# Patient Record
Sex: MALE | Race: WHITE | Employment: OTHER | URBAN - METROPOLITAN AREA
[De-identification: names, ages, dates, MRNs, and addresses within clinical notes are randomized per-mention and may not be internally consistent; named-entity substitution may affect disease eponyms.]

---

## 2017-01-16 ENCOUNTER — TRANSFERRED RECORDS (OUTPATIENT)
Dept: HEALTH INFORMATION MANAGEMENT | Facility: CLINIC | Age: 77
End: 2017-01-16

## 2017-01-17 ENCOUNTER — TRANSFERRED RECORDS (OUTPATIENT)
Dept: HEALTH INFORMATION MANAGEMENT | Facility: CLINIC | Age: 77
End: 2017-01-17

## 2017-06-06 ENCOUNTER — TRANSFERRED RECORDS (OUTPATIENT)
Dept: HEALTH INFORMATION MANAGEMENT | Facility: CLINIC | Age: 77
End: 2017-06-06

## 2018-04-23 ENCOUNTER — TRANSFERRED RECORDS (OUTPATIENT)
Dept: HEALTH INFORMATION MANAGEMENT | Facility: CLINIC | Age: 78
End: 2018-04-23

## 2018-04-25 ENCOUNTER — OFFICE VISIT (OUTPATIENT)
Dept: SLEEP MEDICINE | Facility: HOSPITAL | Age: 78
End: 2018-04-25
Attending: INTERNAL MEDICINE
Payer: MEDICARE

## 2018-04-25 DIAGNOSIS — R09.02 HYPOXIA: Primary | ICD-10-CM

## 2018-04-25 NOTE — NURSING NOTE
Patient picked up over night oximeter number [id number]. Patient was instructed on use of device. Patient verbalized understanding.     Patient will return device tomorrow by: noon

## 2018-04-25 NOTE — MR AVS SNAPSHOT
"              After Visit Summary   2018    Arjun Hassan    MRN: [id number]           Patient Information     Date Of Birth          1940        Visit Information        Provider Department      2018 12:45 PM [facility]        Today's Diagnoses     Hypoxia    -  1       Follow-ups after your visit        Who to contact     If you have questions or need follow up information about today's clinic visit or your schedule please contact [facility] directly at [phone].  Normal or non-critical lab and imaging results will be communicated to you by Wor[employer], letter or phone within 4 business days after the clinic has received the results. If you do not hear from us within 7 days, please contact the clinic through [employer] or phone. If you have a critical or abnormal lab result, we will notify you by phone as soon as possible.  Submit refill requests through [employer] or call your pharmacy and they will forward the refill request to us. Please allow 3 business days for your refill to be completed.          Additional Information About Your Visit        Worke[employer]ica Information     [employer] lets you send messages to your doctor, view your test results, renew your prescriptions, schedule appointments and more. To sign up, go to [email or web address] . Click on \"Log in\" on the left side of the screen, which will take you to the Welcome page. Then click on \"Sign up Now\" on the right side of the page.     You will be asked to enter the access code listed below, as well as some personal information. Please follow the directions to create your username and password.     Your access code is: [email or web address]  Expires: 2018 11:43 AM     Your access code will  in 90 days. If you need help or a new code, please call your [place] clinic or [phone].        Care EveryWhere ID     This is your Care EveryWhere ID. This could be used by other organizations to access your [place] medical " records  [id number]         Blood Pressure from Last 3 Encounters:   05/14/18 111/70    Weight from Last 3 Encounters:   05/14/18 278 lb (126.1 kg)              We Performed the Following     OXIMETRY - HIM SCAN        Primary Care Provider Office Phone # Fax #    Willie Yuen [phone]       [facility] [address]  [facility] [address]        Equal Access to Services     [facility] : Hadii aad ku hadasho Soomaali, waaxda luqadaha, qaybta kaalmada adeegyada, waxay idiin hayaan adeeg kharash la'aan . So [facility] [phone].    ATENCIÓN: Si habla español, tiene a amado disposición servicios gratuitos de asistencia lingüística. Llame al [phone].    We comply with applicable federal civil rights laws and [place] laws. We do not discriminate on the basis of race, color, national origin, age, disability, sex, sexual orientation, or gender identity.            Thank you!     Thank you for choosing [facility]  for your care. Our goal is always to provide you with excellent care. Hearing back from our patients is one way we can continue to improve our services. Please take a few minutes to complete the written survey that you may receive in the mail after your visit with us. Thank you!             Your Updated Medication List - Protect others around you: Learn how to safely use, store and throw away your medicines at www.disposemymeds.org.      Notice  As of 4/25/2018 11:59 PM    You have not been prescribed any medications.

## 2018-05-07 DIAGNOSIS — R06.02 SOB (SHORTNESS OF BREATH): Primary | ICD-10-CM

## 2018-05-10 NOTE — TELEPHONE ENCOUNTER
FUTURE VISIT INFORMATION      FUTURE VISIT INFORMATION:    Date: 5.14.18    Time: 8:05 AM    [facility] [facility]  REFERRAL INFORMATION:    Referring provider:  Dr. Yusuf Ruiz    Referring providers clinic:  [facility]    Reason for visit/diagnosis  SOB    RECORDS REQUESTED FROM:       Clinic name Comments Records Status Imaging Status   [facility] Received referral and summary of pt's pulmonary issues. However did not include detailed ov or imaging. Requested Requested                                   RECORDS STATUS        **Called [phone] on 5.10.18 for urgent request for records om 5.10.18  **2nd request on 5.11.18. Called number listed above.

## 2018-05-14 ENCOUNTER — RADIANT APPOINTMENT (OUTPATIENT)
Dept: GENERAL RADIOLOGY | Facility: CLINIC | Age: 78
End: 2018-05-14
Attending: INTERNAL MEDICINE
Payer: MEDICARE

## 2018-05-14 ENCOUNTER — PRE VISIT (OUTPATIENT)
Dept: PULMONOLOGY | Facility: CLINIC | Age: 78
End: 2018-05-14

## 2018-05-14 ENCOUNTER — OFFICE VISIT (OUTPATIENT)
Dept: PULMONOLOGY | Facility: CLINIC | Age: 78
End: 2018-05-14
Payer: MEDICARE

## 2018-05-14 VITALS
WEIGHT: 278 LBS | BODY MASS INDEX: 41.18 KG/M2 | HEIGHT: 69 IN | DIASTOLIC BLOOD PRESSURE: 70 MMHG | RESPIRATION RATE: 17 BRPM | HEART RATE: 66 BPM | OXYGEN SATURATION: 95 % | SYSTOLIC BLOOD PRESSURE: 111 MMHG

## 2018-05-14 DIAGNOSIS — J44.9 COPD (CHRONIC OBSTRUCTIVE PULMONARY DISEASE) (H): Primary | ICD-10-CM

## 2018-05-14 DIAGNOSIS — R06.02 SOB (SHORTNESS OF BREATH): Primary | ICD-10-CM

## 2018-05-14 DIAGNOSIS — R06.02 SOB (SHORTNESS OF BREATH): ICD-10-CM

## 2018-05-14 DIAGNOSIS — E66.01 MORBID OBESITY (H): ICD-10-CM

## 2018-05-14 PROCEDURE — G0463 HOSPITAL OUTPT CLINIC VISIT: HCPCS | Mod: ZF

## 2018-05-14 RX ORDER — BUDESONIDE AND FORMOTEROL FUMARATE DIHYDRATE 160; 4.5 UG/1; UG/1
2 AEROSOL RESPIRATORY (INHALATION) DAILY
COMMUNITY
End: 2020-08-20

## 2018-05-14 RX ORDER — FAMOTIDINE 10 MG
10 TABLET ORAL 2 TIMES DAILY
COMMUNITY

## 2018-05-14 RX ORDER — POLYETHYLENE GLYCOL 3350 17 G/17G
1 POWDER, FOR SOLUTION ORAL DAILY
COMMUNITY

## 2018-05-14 RX ORDER — DIMENHYDRINATE 50 MG
1 TABLET ORAL DAILY
COMMUNITY

## 2018-05-14 RX ORDER — NAPROXEN SODIUM 220 MG
TABLET ORAL 2 TIMES DAILY WITH MEALS
COMMUNITY

## 2018-05-14 ASSESSMENT — PAIN SCALES - GENERAL: PAINLEVEL: NO PAIN (0)

## 2018-05-14 NOTE — MR AVS SNAPSHOT
"              After Visit Summary   2018    Arjun Hassan    MRN: [id number]           Patient Information     Date Of Birth          1940        Visit Information        Provider Department      2018 8:05 AM Gilmar Seymour MD [facility]        Today's Diagnoses     SOB (shortness of breath)    -  1    Morbid obesity (H)           Follow-ups after your visit        Who to contact     If you have questions or need follow up information about today's clinic visit or your schedule please contact [facility] directly at [phone].  Normal or non-critical lab and imaging results will be communicated to you by [facility], letter or phone within 4 business days after the clinic has received the results. If you do not hear from us within 7 days, please contact the clinic through [facility] or phone. If you have a critical or abnormal lab result, we will notify you by phone as soon as possible.  Submit refill requests through [employer] or call your pharmacy and they will forward the refill request to us. Please allow 3 business days for your refill to be completed.          Additional Information About Your Visit        MyChart Information     [employer] lets you send messages to your doctor, view your test results, renew your prescriptions, schedule appointments and more. To sign up, go to [email or web address] . Click on \"Log in\" on the left side of the screen, which will take you to the Welcome page. Then click on \"Sign up Now\" on the right side of the page.     You will be asked to enter the access code listed below, as well as some personal information. Please follow the directions to create your username and password.     Your access code is: [email or web address]  Expires: 2018 11:43 AM     Your access code will  in 90 days. If you need help or a new code, please call your [place] clinic or [phone].        Care EveryWhere ID     " "This is your Care EveryWhere ID. This could be used by other organizations to access your [place] medical records  [id number]        Your Vitals Were     Pulse Respirations Height Pulse Oximetry BMI (Body Mass Index)       66 17 1.753 m (5' 9\") 95% 41.05 kg/m2        Blood Pressure from Last 3 Encounters:   05/14/18 111/70    Weight from Last 3 Encounters:   05/14/18 126.1 kg (278 lb)              Today, you had the following     No orders found for display       Primary Care Provider Office Phone # Fax #    Willie Yuen [phone]       [facility] [address] [facility] [address]  [facility] [address]        Equal Access to Services     ELEANOR BOWMAN : Hadii mario joseph hadasho Soomaali, waaxda luqadaha, qaybta kaalmada adeegyada, sole burgos . So [place] -[phone].    ATENCIÓN: Si habla español, tiene a amado disposición servicios gratuitos de asistencia lingüística. Llame al [phone].    We comply with applicable federal civil rights laws and [place] laws. We do not discriminate on the basis of race, color, national origin, age, disability, sex, sexual orientation, or gender identity.            Thank you!     Thank you for choosing [facility]  for your care. Our goal is always to provide you with excellent care. Hearing back from our patients is one way we can continue to improve our services. Please take a few minutes to complete the written survey that you may receive in the mail after your visit with us. Thank you!             Your Updated Medication List - Protect others around you: Learn how to safely use, store and throw away your medicines at www.disposemymeds.org.          This list is accurate as of 5/14/18 11:43 AM.  Always use your most recent med list.                   Brand Name Dispense Instructions for use Diagnosis    ACID CONTROLLER 10 MG tablet   Generic drug:  famotidine      Take 10 mg by mouth 2 times daily        ALEVE " 220 MG tablet   Generic drug:  naproxen sodium      Take by mouth 2 times daily (with meals)        ASPIRIN 81 PO           cholecalciferol 1000 UNIT tablet    vitamin D3     Take 1,000 Units by mouth daily        FIBER CHOICE PO           flax seed oil 1000 MG capsule      Take 1 capsule by mouth daily        LEVOTHYROXINE SODIUM PO      Take 175 mcg by mouth daily        LISINOPRIL PO      Take 10 mg by mouth daily        METOPROLOL SUCCINATE ER PO      Take 50 mg by mouth daily        MULTIVITAMIN ADULTS 50+ PO           polyethylene glycol Packet    MIRALAX/GLYCOLAX     Take 1 packet by mouth daily        SALMON OIL PO      Take 1,000 mg by mouth daily        SPIRIVA RESPIMAT 2.5 MCG/ACT inhalation aerosol   Generic drug:  tiotropium      Inhale 2 puffs into the lungs daily        STOOL SOFTENER PO      Take 100 mg by mouth daily        SULINDAC PO      Take 150 mg by mouth as needed        SYMBICORT 160-4.5 MCG/ACT Inhaler   Generic drug:  budesonide-formoterol      Inhale 2 puffs into the lungs daily

## 2018-05-14 NOTE — NURSING NOTE
Chief Complaint   Patient presents with     Consult     Patient being seen for ongoing/worsening shortness of breath     Alejandro Barrera

## 2018-05-14 NOTE — PROGRESS NOTES
[facility]  Pulmonary Medicine  Visit Clinic Note  May 14, 2018         ASSESSMENT & PLAN       Shortness of breath: This is a 77-year-old male who is presenting for her third evaluation of shortness of breath.    Unfortunately I do not have a lot of the original data regarding his workup at [facility] or at the [facility].  It sounds like he has already been through quite a lot, and I did not feel that ordering more tests at this time would be appropriate until I have had a chance to review all the previous testing that he has already had done.    I think some of the notable findings so far on my history and exam are the fact that he has a baseline low-normal oxygen level, but it is improved with exercise.  This is in the setting of having a normal DLCO which corrects to supranormal levels when his alveolar volume is taken into consideration.  This combination suggests atelectasis and hypoventilation as a cause for a lot of his low normal oxygen levels.  During exertion, his tidal volume increases which potentially opens up more alveoli and provides better gas exchange. This could be from his obesity. This gas exchange issue alone may not be the sole reason for his sensation of dyspnea though.    Factors arguing against his obesity as a cause for his symptoms would be that he has lost weight recently and is actually had worsening of his symptoms.    It does not appear that he has COPD.  He gave a good PFT study today, and there is no airflow obstruction.  This doesn't rule out asthma as cause for shortness of breath.      I would like to see all of his formal pulmonary function testing, an echocardiogram, right and left heart catheterization data, as well as blood work that he has received in the past.  We will reach out to the hospitals that he has been worked up at to get all of the original testing results.  I will give him a phone call after I have reviewed all of this to see if I  have any difference of opinion as a cause for his shortness of breath.     He will be back in [place] in late June for a classic car show and we can try to arrange any other testing to be done here if it needs to be done.  Otherwise we can try to arrange for testing locally.     Baldomero Seymour MD     I spent a total of 60 minutes face-to-face with Arjun Hassan during today's office visit.  Over 50% of this time was spent counseling the patient and/or coordinating care regarding possible causes for his shortness of breath, and need for further review of testing to give full opinion. We talked about possible causes for his dyspnea.  See note for details.       Today's visit note:     Chief Complaint: Arjun Hassan is a 77 year old year old male who is being seen for Consult (Patient being seen for ongoing/worsening shortness of breath)      HISTORY OF PRESENT ILLNESS:    This is a 77-year-old male presenting for third opinion regarding shortness of breath.    He lives up in [facility] [place], and has been seen by a local pulmonologist at [facility].  He has had what sounds like an extensive workup for his shortness of breath without any obvious cause.  This prompted a referral to the [facility], where he spent 3 days undergoing testing.  Could not come up with a conclusive reason for his shortness of breath at the [facility].  He is here today for another evaluation.    He notes that about 3 years ago he is feeling fine.  He retired from working as a  at the post [employer].  Since that time he has been experiencing increasing and progressive shortness of breath.  He has been diagnosed with coronary artery disease, COPD, and sleep apnea.  Sleep apnea in fact is been going on for decades, but he has had alterations to his positive pressure.  Is also been prescribed oxygen in the past, but that did not significantly help out with his symptoms.  Notes that his oxygen level was around 88% to  91% during the day at rest, but actually improved with exercise.    There is no cough.  Sometimes he feels a very subtle pressure across his chest.  He wakes up in the morning.  He does not describe it as a pain.  He uses his CPAP every night.      He can walk about 400-500 feet before he needs to take a break.  He also says that he can walk a block or 2 without a problem as long as he paces himself.  He can walk up a flight of stairs without break, but he does get short of breath at the top of the staircase.    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     Takes Symbicort and Spiriva Respimat.  2 puffs of each inhaler at night.  He does not think that they are making substantial improvements in his symptoms, but does admit that he feels a little little less short of breath after taking them.    He has lost about 30 pounds year or so.  In the setting of this his symptoms got worse.    Has been told he is a challenging case.      JOSEPH Bowman. Drover here last night.       No cough. someimtes a pressure across his chest. Sometimes feels it when he wakes up.  Not a pain.     Takes symbicort and spiriva respimat.  2 puffs at night. Not sure if it helping or not.  Maybe helps a bit.      Lost about 30 pounds recently. Dyspnea only has gotten worse.          Past Medical and Surgical History:     Past Medical History:   Diagnosis Date     CAD (coronary artery disease)      COPD (chronic obstructive pulmonary disease) (H)      HTN (hypertension)      Hypothyroidism      NEHEMIAS on CPAP     for the last 30 years     Osteoarthritis      Pneumonia     recurrent pneumonia     Seborrheic keratoses      Past Surgical History:   Procedure Laterality Date     coronary stent placement      x 2     HERNIA REPAIR      right side     lumbar fixation       RELEASE CARPAL TUNNEL Left      right hip arthroplasty       total knee replacement x 2       VASECTOMY             Family History:  "    Family History   Problem Relation Age of Onset     Dementia Mother      DIABETES Mother      CANCER Sister      HEART DISEASE Brother      CANCER Brother               Social History:     Social History     Social History     Marital status:      Spouse name: N/A     Number of children: N/A     Years of education: N/A     Occupational History     Not on file.     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  His hobbies include traveling around to classic car shows, as well as participating in a lot of parades where he has viking float for the \"[place]\"         Medications:     Current Outpatient Prescriptions   Medication     ASPIRIN 81 PO     budesonide-formoterol (SYMBICORT) 160-4.5 MCG/ACT Inhaler     cholecalciferol (VITAMIN D3) 1000 UNIT tablet     Docusate Calcium (STOOL SOFTENER PO)     famotidine (ACID CONTROLLER) 10 MG tablet     Flaxseed, Linseed, (FLAX SEED OIL) 1000 MG capsule     Inulin (FIBER CHOICE PO)     LEVOTHYROXINE SODIUM PO     LISINOPRIL PO     METOPROLOL SUCCINATE ER PO     Multiple Vitamins-Minerals (MULTIVITAMIN ADULTS 50+ PO)     naproxen sodium (ALEVE) 220 MG tablet     Omega-3 Fatty Acids (SALMON OIL PO)     polyethylene glycol (MIRALAX/GLYCOLAX) Packet     SULINDAC PO     tiotropium (SPIRIVA RESPIMAT) 2.5 MCG/ACT inhalation aerosol     No current facility-administered medications for this visit.             Review of Systems:       A complete review of systems was otherwise negative except as noted in the HPI.      PHYSICAL EXAM:  /70  Pulse 66  Resp 17  Ht 1.753 m (5' 9\")  Wt 126.1 kg (278 lb)  SpO2 95%  BMI 41.05 kg/m2     General: Well developed, well nourished, No apparent distress  Eyes: " Anicteric  Nose: Nasal mucosa with no edema or hyperemia.  No polyps  Ears: Hearing grossly normal  Mouth: Oral mucosa is moist, without any lesions. No oropharyngeal exudate.  Neck: supple, no thyromegaly  Lymphatics: No cervical or supraclavicular nodes  Respiratory: Good air movement. No crackles. No rhonchi. No wheezes  Cardiac: RRR, normal S1, S2. No murmurs. No JVD  Abdomen: Soft, NT/ND  Musculoskeletal: Extremities normal. No clubbing. No cyanosis. No edema.  Skin: No rash on limited exam  Neuro: Normal mentation. Normal speech.  Psych:Normal affect           Data:   All laboratory and imaging data reviewed.      PFT:   Pulmonary function testing shows an FEV1/FVC ratio of 77%.  His FEV1 is 2.36 L which is 83% predicted.  His FVC is 3.08 which is 81% predicted.  Lung volumes show a total lung capacity of 6.04 L which is 87% predicted and the residual volume is 101% predicted.  His diffusion capacity is 107% predicted.    PFT Interpretation:  No airflow obstruction.  Normal lung volumes.  Normal diffusion capacity.  Valid Maneuver    Overnight oximetry:  Total time of analysis was 9 hours and 50 minutes.  He had a saturation under 90% for 12% of the study.  He had a saturation under 85% for 0.2% of the study.  Basal oxygen saturation is 92.1%, and the maximum single time that he is under 88% was 420 seconds.  Lowest pulse rate was 48 bpm.    CXR: Official radiology read is pending, but on my read the lung volumes appear normal.  There is no infiltrate.  Normal cardiac size.  There may be some blunting of the left costophrenic angle, but no abnormalities on the lateral imaging.      Recent Results (from the past 168 hour(s))   General PFT Lab (Please always keep checked)    Collection Time: 05/14/18  6:50 AM   Result Value Ref Range    FVC-Pred 3.80 L    FVC-Pre 3.08 L    FVC-%Pred-Pre 81 %    FEV1-Pre 2.36 L    FEV1-%Pred-Pre 83 %    FEV1FVC-Pred 72 %    FEV1FVC-Pre 77 %    FEFMax-Pred 7.15 L/sec    FEFMax-Pre  6.62 L/sec    FEFMax-%Pred-Pre 92 %    FEF2575-Pred 2.02 L/sec    FEF2575-Pre 1.93 L/sec    NGM0756-%Pred-Pre 95 %    ExpTime-Pre 8.06 sec    FIFMax-Pre 6.27 L/sec    VC-Pred 4.38 L    VC-Pre 3.19 L    VC-%Pred-Pre 72 %    IC-Pred 4.21 L    IC-Pre 3.08 L    IC-%Pred-Pre 73 %    ERV-Pred 0.17 L    ERV-Pre 0.11 L    ERV-%Pred-Pre 67 %    FEV1FEV6-Pred 76 %    FEV1FEV6-Pre 77 %    FRCPleth-Pred 3.72 L    FRCPleth-Pre 2.96 L    FRCPleth-%Pred-Pre 79 %    RVPleth-Pred 2.80 L    RVPleth-Pre 2.85 L    RVPleth-%Pred-Pre 101 %    TLCPleth-Pred 6.92 L    TLCPleth-Pre 6.04 L    TLCPleth-%Pred-Pre 87 %    DLCOunc-Pred 23.70 ml/min/mmHg    DLCOunc-Pre 25.56 ml/min/mmHg    DLCOunc-%Pred-Pre 107 %    VA-Pre 5.04 L    VA-%Pred-Pre 76 %    FEV1SVC-Pred 64 %    FEV1SVC-Pre 74 %                 Addendum:     I have received records from [facility] and [facility]. Summarized below:    [facility]:    Pulmonary function testing in November 2016:  FEV1/FVC ratio is 71%.  His FEV1 is 2.15 L which is 72% predicted, and this increases to 2.45 L after bronchodilator.  This is a 13% increase.  His FVC is 3.02 L which is 72% predicted, and increases to 3.22 L.  Lung volumes show a total lung capacity of 6.38 L which is 90% predicted, and a residual volume of 3.37 L which is 129% predicted.  Diffusing capacity is 52% predicted, but improved normal after correction for alveolar volume.    I do not have other actual reports of testing from [facility], but I do have clinic notes which described the results:    Cardiac stress test March 2012: Normal  Nuclear stress test in October 2016 shows normal perfusion  Right heart catheterization November 2016: Pulmonary artery pressure of 35/20, pulmonary wedge pressure 16 mmHg.    Angiogram of the chest June 2013: No pulmonary embolism, and no other abnormalities with the exception of atherosclerotic coronary calcifications.    Cardiac catheterization 2012: 2 stent placements.    Echocardiogram February  2014: Left ventricular ejection fraction greater than 55%, right ventricular systolic pressure 39 mmHg.    Pulmonary function testing August 2013 FEV1/FVC ratio 71%.  FEV1 2.4 L 77% predicted, and FVC of 3.37 L which is 78% predicted.  Visual volume of 4.23 L which is 171% predicted.      [facility]:     ECG - 1/2017: incomplete RBBB. Non-specific ST changes.      Chest CT scan January 2017: Changes of infectious or inflammatory airways disease without worrisome nodules or masses.  Mild bronchial dilatation with bronchial wall thickening in both lung bases greater on the left likely due to airways inflammation.  No focal pulmonary infiltrates or pleural effusions.  No significant air trapping on expiratory views.  I lateral apical scarring.  No worrisome pulmonary nodules.  Calcified granulomas with calcified hilar lymph nodes indicative of old granulomatous infection.    Exercise test: Both a cardiopulmonary test as well as post exercise spirometry was performed.  He exercised for approximately 12 minutes.  Total work done 135 W which is 93% predicted.  VO2 max is 2.43 L/min which is 83% predicted indicating that there is a slight exercise limitation.  Anaerobic threshold was met at the appropriate time which was 55% of maximum VO2.  His heart rate jeffrey to 141/min which is 99% predicted.  Maximum minute ventilation is 78.9 L/min which is 89% predicted.  Respiratory rate at maximum was 48 breaths per minute.  Ventilatory reserve with 11% predicted.  Arterial blood gas shows a pH of 7.4, PCO2 42.1, PO2 63.7.      The exercise spirometry shows an FEV1/FVC ratio of 68.7, with an FEV1 of 2.28 L which is 81% predicted.  FVC is 3.32 L which is 88% predicted.  After exercise, his FEV1 decreases to 2.04 L which is an 11% decrease.  His FVC decreases to 3.03 L which is a 9% decrease.    Impression of the study was an abnormal study.  Exercise capacity is within normal limits, but maximum oxygen uptake is slightly reduced  with abnormal ventilatory response and gas exchange normal cardiovascular response to exercise.  Mild reduction in work capacity appears ventilatory limitation with paradoxical hypercapnia and gas exchange abnormality with increased AA gradient at peak exercise.  Also associated with bronchospasm after exercise.    Echocardiogram final impression January 2017:  Normal left ventricular chamber size and wall thickness.  Calculated ejection fraction 60%  No regional wall motion abnormalities  Grade 1 left ventricular diastolic dysfunction  Mild right ventricular enlargement, with a normal right ventricular systolic function  Estimated right ventricular systolic pressure of 30 mmHg  Mild tricuspid valve regurgitation  Normal IVC size and normal inspiratory collapse  Patient foramen ovale with left to right shunt    Blood work January 2017: hemoglobin 13.3, platelets 219, white blood cell 7.6.  Metabolic panel shows a sodium of 144, potassium 5.1, 93, alkaline phosphatase 55, AST 26, ALT 31, total bilirubin 0.5, creatinine 1.2 and NT-proBNP 68, TSH 0.3.    Pre-exercise arterial blood gas: PH 7.45, PO2 86.7 CO2 35.7.  Post exercise arterial blood gas 7.4, PO2 63.7, CO2 42.1.          Assessment:    I have reviewed the records from the [facility] and [facility].    Despite having coronary artery disease, he has relatively normal cardiac function.  There is some slight diastolic dysfunction, which may be worse in the setting of exercise.  Additionally, he may have mild pulmonary hypertension could also worsen with exercise.    More abnormalities are found in his lungs though.  Our pulmonary function testing showed a very normal flow volume loop and lung volumes.  However both at [facility], and the [facility], he has had some evidence of airflow obstruction and broncho-reversibility.  Additionally, post exercise his FEV1 dropped 11%.  This could be consistent with exercise-induced bronchospasm.  He also had  pulmonary function testing that showed an elevated residual volume consistent with gas trapping, again consistent with some airflow obstruction.  During his cardiopulmonary exercise test, he may have been hyperventilating to start (pH was high-normal).  However his partial pressure of oxygen drops, and his carbon dioxide increases.  An increase of partial pressure of carbon dioxide after exercise is indicative of increased dead space ventilation.  In the context of a lower forced expiratory volume after exercise this could be related to bronchospasm.  An alternative diagnosis to explain to gas exchange abnormality could be pulmonary vascular disease, however he did not have a cardiac limitation during the study.  He has had abnormalities noted in the chest CT scan of airway inflammation.    I believe that the best explanation for his shortness of breath is multifactorial.  I do believe that he has some degree of airways disease given his previous pulmonary function testing, as well as chest CT scan results, and cardiopulmonary exercise testing results.  The inhaler therapy that he is on is appropriate.  However, I do believe a major part of his shortness of breath is related to obesity and deconditioning.  I will talk to him about participating in a pulmonary rehab program in order to try to get in better shape.    We could consider getting a ventilation/perfusion lung scan to look for evidence of chronic thromboembolic disease.  This could potentially explain his low normal oxygen levels, and gas exchange abnormalities on cardiopulmonary exam.  If this shows any perfusion defects, a repeat right heart catheterization could be performed.  This may show worsening of his pulmonary hypertension.  He could also get an exercise test while right heart cath is in place in order to better define his hemodynamics during exercise.      I will discuss these ideas both with the patient and Dr. Ruiz, and try to organize and  appropriate treatment plan.       Baldomero Seymour MD

## 2018-05-14 NOTE — LETTER
5/14/2018       RE: Arjun Hassan  [address]  [place] [address]     Dear Colleague,    Thank you for referring your patient, Arjun Hassan, to the [facility] at [facility]. Please see a copy of my visit note below.    [facility]  Pulmonary Medicine  Visit Clinic Note  May 14, 2018         ASSESSMENT & PLAN       Shortness of breath: This is a 77-year-old male who is presenting for her third evaluation of shortness of breath.    Unfortunately I do not have a lot of the original data regarding his workup at [facility] the [facility] or at the [facility].  It sounds like he has already been through quite a lot, and I did not feel that ordering more tests at this time would be appropriate until I have had a chance to review all the previous testing that he has already had done.    I think some of the notable findings so far on my history and exam are the fact that he has a baseline low-normal oxygen level, but it is improved with exercise.  This is in the setting of having a normal DLCO which corrects to supranormal levels when his alveolar volume is taken into consideration.  This combination suggests atelectasis and hypoventilation as a cause for a lot of his low normal oxygen levels.  During exertion, his tidal volume increases which potentially opens up more alveoli and provides better gas exchange. This could be from his obesity. This gas exchange issue alone may not be the sole reason for his sensation of dyspnea though.    Factors arguing against his obesity as a cause for his symptoms would be that he has lost weight recently and is actually had worsening of his symptoms.    It does not appear that he has COPD.  He gave a good PFT study today, and there is no airflow obstruction.  This doesn't rule out asthma as cause for shortness of breath.      I would like to see all of his formal pulmonary function testing, an  echocardiogram, right and left heart catheterization data, as well as blood work that he has received in the past.  We will reach out to the hospitals that he has been worked up at to get all of the original testing results.  I will give him a phone call after I have reviewed all of this to see if I have any difference of opinion as a cause for his shortness of breath.     He will be back in [place] in late June for a classic car show and we can try to arrange any other testing to be done here if it needs to be done.  Otherwise we can try to arrange for testing locally.     Baldomero Seymour MD     I spent a total of 60 minutes face-to-face with Arjun Hassan during today's office visit.  Over 50% of this time was spent counseling the patient and/or coordinating care regarding possible causes for his shortness of breath, and need for further review of testing to give full opinion. We talked about possible causes for his dyspnea.  See note for details.       Today's visit note:     Chief Complaint: Arjun Hassan is a 77 year old year old male who is being seen for Consult (Patient being seen for ongoing/worsening shortness of breath)      HISTORY OF PRESENT ILLNESS:    This is a 77-year-old male presenting for third opinion regarding shortness of breath.    He lives up in [facility], and has been seen by a local pulmonologist at [facility].  He has had what sounds like an extensive workup for his shortness of breath without any obvious cause.  This prompted a referral to the [facility], where he spent 3 days undergoing testing.  Could not come up with a conclusive reason for his shortness of breath at the [facility].  He is here today for another evaluation.    He notes that about 3 years ago he is feeling fine.  He retired from working as a  at the post [employer].  Since that time he has been experiencing increasing and progressive shortness of breath.  He has been diagnosed with  coronary artery disease, COPD, and sleep apnea.  Sleep apnea in fact is been going on for decades, but he has had alterations to his positive pressure.  Is also been prescribed oxygen in the past, but that did not significantly help out with his symptoms.  Notes that his oxygen level was around 88% to 91% during the day at rest, but actually improved with exercise.    There is no cough.  Sometimes he feels a very subtle pressure across his chest.  He wakes up in the morning.  He does not describe it as a pain.  He uses his CPAP every night.      He can walk about 400-500 feet before he needs to take a break.  He also says that he can walk a block or 2 without a problem as long as he paces himself.  He can walk up a flight of stairs without break, but he does get short of breath at the top of the staircase.    Classic cars. [employer]) meant for [place]. Dozen parades a year.     Takes Symbicort and Spiriva Respimat.  2 puffs of each inhaler at night.  He does not think that they are making substantial improvements in his symptoms, but does admit that he feels a little little less short of breath after taking them.    He has lost about 30 pounds year or so.  In the setting of this his symptoms got worse.    Has been told he is a challenging case.      JOSEPH Bowman. Drover here last night.       No cough. someimtes a pressure across his chest. Sometimes feels it when he wakes up.  Not a pain.     Takes symbicort and spiriva respimat.  2 puffs at night. Not sure if it helping or not.  Maybe helps a bit.      Lost about 30 pounds recently. Dyspnea only has gotten worse.          Past Medical and Surgical History:     Past Medical History:   Diagnosis Date     CAD (coronary artery disease)      COPD (chronic obstructive pulmonary disease) (H)      HTN (hypertension)      Hypothyroidism      NEHEMIAS on CPAP     for the last 30 years     Osteoarthritis      Pneumonia     recurrent pneumonia      "Seborrheic keratoses      Past Surgical History:   Procedure Laterality Date     coronary stent placement      x 2     HERNIA REPAIR      right side     lumbar fixation       RELEASE CARPAL TUNNEL Left      right hip arthroplasty       total knee replacement x 2       VASECTOMY             Family History:     Family History   Problem Relation Age of Onset     Dementia Mother      DIABETES Mother      CANCER Sister      HEART DISEASE Brother      CANCER Brother               Social History:     Social History     Social History     Marital status:      Spouse name: N/A     Number of children: N/A     Years of education: N/A     Occupational History     Not on file.     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  His hobbies include traveling around to classic car shows, as well as participating in a lot of parades where he has viking float for the \"[place]\"         Medications:     Current Outpatient Prescriptions   Medication     ASPIRIN 81 PO     budesonide-formoterol (SYMBICORT) 160-4.5 MCG/ACT Inhaler     cholecalciferol (VITAMIN D3) 1000 UNIT tablet     Docusate Calcium (STOOL SOFTENER PO)     famotidine (ACID CONTROLLER) 10 MG tablet     Flaxseed, Linseed, (FLAX SEED OIL) 1000 MG capsule     Inulin (FIBER CHOICE PO)     LEVOTHYROXINE SODIUM PO     LISINOPRIL PO     METOPROLOL SUCCINATE ER PO     Multiple Vitamins-Minerals (MULTIVITAMIN ADULTS 50+ PO)     naproxen sodium (ALEVE) 220 MG tablet     Omega-3 Fatty Acids (SALMON OIL PO)     polyethylene glycol (MIRALAX/GLYCOLAX) Packet     SULINDAC PO     tiotropium (SPIRIVA RESPIMAT) 2.5 MCG/ACT inhalation aerosol     No current facility-administered medications for this visit.             " "Review of Systems:       A complete review of systems was otherwise negative except as noted in the HPI.      PHYSICAL EXAM:  /70  Pulse 66  Resp 17  Ht 1.753 m (5' 9\")  Wt 126.1 kg (278 lb)  SpO2 95%  BMI 41.05 kg/m2     General: Well developed, well nourished, No apparent distress  Eyes: Anicteric  Nose: Nasal mucosa with no edema or hyperemia.  No polyps  Ears: Hearing grossly normal  Mouth: Oral mucosa is moist, without any lesions. No oropharyngeal exudate.  Neck: supple, no thyromegaly  Lymphatics: No cervical or supraclavicular nodes  Respiratory: Good air movement. No crackles. No rhonchi. No wheezes  Cardiac: RRR, normal S1, S2. No murmurs. No JVD  Abdomen: Soft, NT/ND  Musculoskeletal: Extremities normal. No clubbing. No cyanosis. No edema.  Skin: No rash on limited exam  Neuro: Normal mentation. Normal speech.  Psych:Normal affect           Data:   All laboratory and imaging data reviewed.      PFT:   Pulmonary function testing shows an FEV1/FVC ratio of 77%.  His FEV1 is 2.36 L which is 83% predicted.  His FVC is 3.08 which is 81% predicted.  Lung volumes show a total lung capacity of 6.04 L which is 87% predicted and the residual volume is 101% predicted.  His diffusion capacity is 107% predicted.    PFT Interpretation:  No airflow obstruction.  Normal lung volumes.  Normal diffusion capacity.  Valid Maneuver    Overnight oximetry:  Total time of analysis was 9 hours and 50 minutes.  He had a saturation under 90% for 12% of the study.  He had a saturation under 85% for 0.2% of the study.  Basal oxygen saturation is 92.1%, and the maximum single time that he is under 88% was 420 seconds.  Lowest pulse rate was 48 bpm.    CXR: Official radiology read is pending, but on my read the lung volumes appear normal.  There is no infiltrate.  Normal cardiac size.  There may be some blunting of the left costophrenic angle, but no abnormalities on the lateral imaging.      Recent Results (from the past " 168 hour(s))   General PFT Lab (Please always keep checked)    Collection Time: 05/14/18  6:50 AM   Result Value Ref Range    FVC-Pred 3.80 L    FVC-Pre 3.08 L    FVC-%Pred-Pre 81 %    FEV1-Pre 2.36 L    FEV1-%Pred-Pre 83 %    FEV1FVC-Pred 72 %    FEV1FVC-Pre 77 %    FEFMax-Pred 7.15 L/sec    FEFMax-Pre 6.62 L/sec    FEFMax-%Pred-Pre 92 %    FEF2575-Pred 2.02 L/sec    FEF2575-Pre 1.93 L/sec    HDB9081-%Pred-Pre 95 %    ExpTime-Pre 8.06 sec    FIFMax-Pre 6.27 L/sec    VC-Pred 4.38 L    VC-Pre 3.19 L    VC-%Pred-Pre 72 %    IC-Pred 4.21 L    IC-Pre 3.08 L    IC-%Pred-Pre 73 %    ERV-Pred 0.17 L    ERV-Pre 0.11 L    ERV-%Pred-Pre 67 %    FEV1FEV6-Pred 76 %    FEV1FEV6-Pre 77 %    FRCPleth-Pred 3.72 L    FRCPleth-Pre 2.96 L    FRCPleth-%Pred-Pre 79 %    RVPleth-Pred 2.80 L    RVPleth-Pre 2.85 L    RVPleth-%Pred-Pre 101 %    TLCPleth-Pred 6.92 L    TLCPleth-Pre 6.04 L    TLCPleth-%Pred-Pre 87 %    DLCOunc-Pred 23.70 ml/min/mmHg    DLCOunc-Pre 25.56 ml/min/mmHg    DLCOunc-%Pred-Pre 107 %    VA-Pre 5.04 L    VA-%Pred-Pre 76 %    FEV1SVC-Pred 64 %    FEV1SVC-Pre 74 %                 Again, thank you for allowing me to participate in the care of your patient.      Sincerely,    Gilmar Seymour MD

## 2018-05-17 LAB
DLCOUNC-%PRED-PRE: 107 %
DLCOUNC-PRE: 25.56 ML/MIN/MMHG
DLCOUNC-PRED: 23.7 ML/MIN/MMHG
ERV-%PRED-PRE: 67 %
ERV-PRE: 0.11 L
ERV-PRED: 0.17 L
EXPTIME-PRE: 8.06 SEC
FEF2575-%PRED-PRE: 95 %
FEF2575-PRE: 1.93 L/SEC
FEF2575-PRED: 2.02 L/SEC
FEFMAX-%PRED-PRE: 92 %
FEFMAX-PRE: 6.62 L/SEC
FEFMAX-PRED: 7.15 L/SEC
FEV1-%PRED-PRE: 83 %
FEV1-PRE: 2.36 L
FEV1FEV6-PRE: 77 %
FEV1FEV6-PRED: 76 %
FEV1FVC-PRE: 77 %
FEV1FVC-PRED: 72 %
FEV1SVC-PRE: 74 %
FEV1SVC-PRED: 64 %
FIFMAX-PRE: 6.27 L/SEC
FRCPLETH-%PRED-PRE: 79 %
FRCPLETH-PRE: 2.96 L
FRCPLETH-PRED: 3.72 L
FVC-%PRED-PRE: 81 %
FVC-PRE: 3.08 L
FVC-PRED: 3.8 L
IC-%PRED-PRE: 73 %
IC-PRE: 3.08 L
IC-PRED: 4.21 L
RVPLETH-%PRED-PRE: 101 %
RVPLETH-PRE: 2.85 L
RVPLETH-PRED: 2.8 L
TLCPLETH-%PRED-PRE: 87 %
TLCPLETH-PRE: 6.04 L
TLCPLETH-PRED: 6.92 L
VA-%PRED-PRE: 76 %
VA-PRE: 5.04 L
VC-%PRED-PRE: 72 %
VC-PRE: 3.19 L
VC-PRED: 4.38 L

## 2018-06-13 ENCOUNTER — TELEPHONE (OUTPATIENT)
Dept: PULMONOLOGY | Facility: CLINIC | Age: 78
End: 2018-06-13

## 2018-06-13 NOTE — TELEPHONE ENCOUNTER
"  Call from pt claims he is waiting for Dr Seymour to call regarding follow up.  According to pt Dr LOVETT was going to consult with another Dr and come up with an appropriate treatment plan.  See EPIC progress notes from 5/14/18.  Pt also reports approx 1 1/2 wks ago he began to loose his voice, he believed it was from his Spiriva and Symbicort, so he quite using them.  Pt said he monitored his O2 during use and after d/c and it remained the same at 88-91%.  Pt states \"I just can't see a difference when I'm using the inhalers, I feel the same\".  This writer explained a message would be sent to Dr Seymour and his nurse Олег for a return phone call with recommendations.  Please call pt's home [phone] or cell [phone]. Glory Victoria LPN      ___________________________________________      I called Mr Hassan to talk about his test results.  See my addendum from my office note.      He will not get the ventilation perfusion scan right now.      I let him know that I would reach out to Dr. Ruiz in [place]. Mr Hassan is more than welcome to see me again in the future if symptoms worsen.     Baldomero Seymour MD  "

## 2020-06-18 ENCOUNTER — TRANSFERRED RECORDS (OUTPATIENT)
Dept: HEALTH INFORMATION MANAGEMENT | Facility: CLINIC | Age: 80
End: 2020-06-18

## 2020-06-30 ENCOUNTER — TRANSFERRED RECORDS (OUTPATIENT)
Dept: HEALTH INFORMATION MANAGEMENT | Facility: CLINIC | Age: 80
End: 2020-06-30

## 2020-07-31 DIAGNOSIS — H91.93 DECREASED HEARING OF BOTH EARS: Primary | ICD-10-CM

## 2020-08-20 ENCOUNTER — OFFICE VISIT (OUTPATIENT)
Dept: OTOLARYNGOLOGY | Facility: OTHER | Age: 80
End: 2020-08-20
Attending: AUDIOLOGIST
Payer: MEDICARE

## 2020-08-20 ENCOUNTER — OFFICE VISIT (OUTPATIENT)
Dept: AUDIOLOGY | Facility: OTHER | Age: 80
End: 2020-08-20
Attending: AUDIOLOGIST
Payer: MEDICARE

## 2020-08-20 VITALS
HEART RATE: 60 BPM | DIASTOLIC BLOOD PRESSURE: 70 MMHG | SYSTOLIC BLOOD PRESSURE: 120 MMHG | OXYGEN SATURATION: 92 % | TEMPERATURE: 97.8 F

## 2020-08-20 DIAGNOSIS — Z86.69 HX OF SUDDEN HEARING LOSS: ICD-10-CM

## 2020-08-20 DIAGNOSIS — H90.3 SENSORINEURAL HEARING LOSS (SNHL) OF BOTH EARS: Primary | ICD-10-CM

## 2020-08-20 DIAGNOSIS — H93.13 TINNITUS, BILATERAL: ICD-10-CM

## 2020-08-20 DIAGNOSIS — H91.93 DECREASED HEARING OF BOTH EARS: ICD-10-CM

## 2020-08-20 PROBLEM — H10.45 OTHER CHRONIC ALLERGIC CONJUNCTIVITIS OF BOTH EYES: Status: ACTIVE | Noted: 2020-05-14

## 2020-08-20 PROBLEM — H04.203 BILATERAL EPIPHORA: Status: ACTIVE | Noted: 2019-11-06

## 2020-08-20 PROBLEM — H52.7 AMETROPIA: Status: ACTIVE | Noted: 2019-11-06

## 2020-08-20 PROCEDURE — 92550 TYMPANOMETRY & REFLEX THRESH: CPT | Performed by: AUDIOLOGIST

## 2020-08-20 PROCEDURE — 92557 COMPREHENSIVE HEARING TEST: CPT | Performed by: AUDIOLOGIST

## 2020-08-20 PROCEDURE — 92504 EAR MICROSCOPY EXAMINATION: CPT | Performed by: PHYSICIAN ASSISTANT

## 2020-08-20 PROCEDURE — 99213 OFFICE O/P EST LOW 20 MIN: CPT | Mod: 25 | Performed by: PHYSICIAN ASSISTANT

## 2020-08-20 PROCEDURE — G0463 HOSPITAL OUTPT CLINIC VISIT: HCPCS | Mod: 25

## 2020-08-20 ASSESSMENT — PAIN SCALES - GENERAL: PAINLEVEL: NO PAIN (0)

## 2020-08-20 NOTE — PROGRESS NOTES
Otolaryngology Consultation    Patient: Arjun Hassan  : 1940    Patient presents with:  Hearing Problem: Pt has been referred by [facility] for bilateral decreased hearing.      HPI:  Arjun Hassan is a 80 year old male seen today for bilateral hearing loss. He has hearing loss for quite sometime. He reported aids from outside vendor about 10+ years ago.   He recently lost his aids, and interested in getting new ones.     Arjun had sudden hearing loss on right. He had felt his hearing overall grossly decreased but has returned to his baseline now.   He completed MRI of IAC. No known mass.   Tinnitus which is present bilaterally. He does have a constant roaring in both ears.       Denies COM or otologic surgeries.   Denies otalgia, otorrhea  Denies worrisome tinnitus  Denies fluctuating hearing loss or tinnitus.   Denies vertigo or facial paraesthesia.   Siblings and father had hearing loss, age related.   No known noise exposure.     Audiogram  Type A tympanograms.   Thresholds are mild to severe SNHL, high frequency improved left ear to resolve ABG.   SRT=PTA  WRS- poor right and very poor left.   44% Right at 85 dB, left 24% at 85 dB.         Current Outpatient Rx   Medication Sig Dispense Refill     ASPIRIN 81 PO        Docusate Calcium (STOOL SOFTENER PO) Take 100 mg by mouth daily       famotidine (ACID CONTROLLER) 10 MG tablet Take 10 mg by mouth 2 times daily       Flaxseed, Linseed, (FLAX SEED OIL) 1000 MG capsule Take 1 capsule by mouth daily       Inulin (FIBER CHOICE PO)        LEVOTHYROXINE SODIUM PO Take 175 mcg by mouth daily       LISINOPRIL PO Take 10 mg by mouth daily       METOPROLOL SUCCINATE ER PO Take 50 mg by mouth daily       Multiple Vitamins-Minerals (MULTIVITAMIN ADULTS 50+ PO)        naproxen sodium (ALEVE) 220 MG tablet Take by mouth 2 times daily (with meals)       Omega-3 Fatty Acids (SALMON OIL PO) Take 1,000 mg by mouth daily       polyethylene glycol (MIRALAX/GLYCOLAX)  Packet Take 1 packet by mouth daily       SULINDAC PO Take 150 mg by mouth as needed       tiotropium (SPIRIVA RESPIMAT) 2.5 MCG/ACT inhalation aerosol Inhale 2 puffs into the lungs daily         Allergies: Perfume; Smoke.; and Penicillins     Past Medical History:   Diagnosis Date     CAD (coronary artery disease)      COPD (chronic obstructive pulmonary disease) (H)      HTN (hypertension)      Hypothyroidism      NEHEMIAS on CPAP     for the last 30 years     Osteoarthritis      Pneumonia     recurrent pneumonia     Seborrheic keratoses        Past Surgical History:   Procedure Laterality Date     coronary stent placement      x 2     HERNIA REPAIR      right side     lumbar fixation       RELEASE CARPAL TUNNEL Left      right hip arthroplasty       total knee replacement x 2       VASECTOMY         ENT family history reviewed    Social History     Tobacco Use     Smoking status: Former Smoker     Packs/day: 0.50     Years: 20.00     Pack years: 10.00     Start date:      Last attempt to quit:      Years since quittin.6     Smokeless tobacco: Never Used   Substance Use Topics     Alcohol use: No     Drug use: No       Review of Systems  ROS: 10 point ROS neg other than the symptoms noted above in the HPI     Physical Exam  /70   Pulse 60   Temp 97.8  F (36.6  C) (Tympanic)   SpO2 92%   General - The patient is well nourished and well developed, and appears to have good nutritional status.  Alert and oriented to person and place, answers questions and cooperates with examination appropriately.   Head and Face - Normocephalic and atraumatic, with no gross asymmetry noted.  The facial nerve is intact, with strong symmetric movements.  Voice and Breathing - The patient was breathing comfortably without the use of accessory muscles. There was no wheezing, stridor, or stertor.  The patients voice was clear and strong, and had appropriate pitch and quality.  Ears - Ears examined with otoscope and under  otologic microscopy. The external auditory canals are patent, the tympanic membranes are intact without effusion, retraction or mass.  Bony landmarks are intact.  Eyes - Extraocular movements intact, and the pupils were reactive to light.  Sclera were not icteric or injected, conjunctiva were pink and moist.  Mouth - Examination of the oral cavity showed pink, healthy oral mucosa. No lesions or ulcerations noted.  The tongue was mobile and midline, and the dentition was plate.   Throat - The walls of the oropharynx were smooth, pink, moist, symmetric, and had no lesions or ulcerations.  The tonsillar pillars and soft palate were symmetric.  The uvula was midline on elevation.    Neck - Normal midline excursion of the laryngotracheal complex during swallowing.  Full range of motion on passive movement.  Palpation of the occipital, submental, submandibular, internal jugular chain, and supraclavicular nodes did not demonstrate any abnormal lymph nodes or masses.  Palpation of the thyroid was soft and smooth, with no nodules or goiter appreciated.  The trachea was mobile and midline.  Nose - External contour is symmetric, no gross deflection or scars.  Nasal mucosa is pink and moist with no abnormal mucus.           ASSESSMENT:    ICD-10-CM    1. Sensorineural hearing loss (SNHL) of both ears  H90.3    2. Tinnitus, bilateral  H93.13    3. Hx of sudden hearing loss  Z86.69     REported hearing loss in june, but recovered.        He did have a prior MRI of brain and IAC completed from outside facility in June due to right hearing change.   Will request results and imaging for review.   He does not believe an audiogram was completed at that time.   I am not able to determine if he had sudden SNHl and recovered or an effusion. However, at this time, he feels his right ear has improved to his baseline.     Hearing protection recommended.   He will consider Webinar regarding Audiology and neurotology options from the  [place] in September. If he is a candidate, he is interested in this option.   Nonetheless, if he declines , he will proceed with hearing aid consult here.    A follow-up audiogram is recommended in 12 months.  This should be sooner if a patient develops vertigo, new or asymmetric hearing loss or unresolved unilateral tinnitus.  If these symptoms were to develop, an MRI would need to be obtained.    The recommendations from the tinnitus brochure were discussed.  The most common reason for tinnitus is damage to the microscopic endings of the hearing nerve in the inner ear.  Injury to the nerve endings brings on hearing loss and often tinnitus.  Advancing age can accompany hearing loss and tinnitus.  If a person is younger, loud noise probably is the leading cause of tinnitus.  Delayed damage to hearing is often seen as well.  Ear protection from noise exposure was reviewed.  Highlights included low salt diet, control of hypertension, avoiding stimulants such as caffeine, tobacco or alcohol and proper sleep, exercise and stress management.      He agrees with this plan.       Brittney Miller PA-C  ENT  [facility], [place]  [phone]

## 2020-08-20 NOTE — LETTER
2020         RE: Arjun Hassan  [address] [place] [address]        Dear Colleague,    Thank you for referring your patient, Arjun Hassan, to the [facility]. Please see a copy of my visit note below.    Otolaryngology Consultation    Patient: Arjun Hassan  : 1940    Patient presents with:  Hearing Problem: Pt has been referred by [facility] for bilateral decreased hearing.      HPI:  Arjun Hassan is a 80 year old male seen today for bilateral hearing loss. He has hearing loss for quite sometime. He reported aids from outside vendor about 10+ years ago.   He recently lost his aids, and interested in getting new ones.     Arjun had sudden hearing loss on right. He had felt his hearing overall grossly decreased but has returned to his baseline now.   He completed MRI of IAC. No known mass.   Tinnitus which is present bilaterally. He does have a constant roaring in both ears.       Denies COM or otologic surgeries.   Denies otalgia, otorrhea  Denies worrisome tinnitus  Denies fluctuating hearing loss or tinnitus.   Denies vertigo or facial paraesthesia.   Siblings and father had hearing loss, age related.   No known noise exposure.     Audiogram  Type A tympanograms.   Thresholds are mild to severe SNHL, high frequency improved left ear to resolve ABG.   SRT=PTA  WRS- poor right and very poor left.   44% Right at 85 dB, left 24% at 85 dB.         Current Outpatient Rx   Medication Sig Dispense Refill     ASPIRIN 81 PO        Docusate Calcium (STOOL SOFTENER PO) Take 100 mg by mouth daily       famotidine (ACID CONTROLLER) 10 MG tablet Take 10 mg by mouth 2 times daily       Flaxseed, Linseed, (FLAX SEED OIL) 1000 MG capsule Take 1 capsule by mouth daily       Inulin (FIBER CHOICE PO)        LEVOTHYROXINE SODIUM PO Take 175 mcg by mouth daily       LISINOPRIL PO Take 10 mg by mouth daily       METOPROLOL SUCCINATE ER PO Take 50 mg by mouth daily       Multiple  Vitamins-Minerals (MULTIVITAMIN ADULTS 50+ PO)        naproxen sodium (ALEVE) 220 MG tablet Take by mouth 2 times daily (with meals)       Omega-3 Fatty Acids (SALMON OIL PO) Take 1,000 mg by mouth daily       polyethylene glycol (MIRALAX/GLYCOLAX) Packet Take 1 packet by mouth daily       SULINDAC PO Take 150 mg by mouth as needed       tiotropium (SPIRIVA RESPIMAT) 2.5 MCG/ACT inhalation aerosol Inhale 2 puffs into the lungs daily         Allergies: Perfume; Smoke.; and Penicillins     Past Medical History:   Diagnosis Date     CAD (coronary artery disease)      COPD (chronic obstructive pulmonary disease) (H)      HTN (hypertension)      Hypothyroidism      NEHEMIAS on CPAP     for the last 30 years     Osteoarthritis      Pneumonia     recurrent pneumonia     Seborrheic keratoses        Past Surgical History:   Procedure Laterality Date     coronary stent placement      x 2     HERNIA REPAIR      right side     lumbar fixation       RELEASE CARPAL TUNNEL Left      right hip arthroplasty       total knee replacement x 2       VASECTOMY         ENT family history reviewed    Social History     Tobacco Use     Smoking status: Former Smoker     Packs/day: 0.50     Years: 20.00     Pack years: 10.00     Start date:      Last attempt to quit:      Years since quittin.6     Smokeless tobacco: Never Used   Substance Use Topics     Alcohol use: No     Drug use: No       Review of Systems  ROS: 10 point ROS neg other than the symptoms noted above in the HPI     Physical Exam  /70   Pulse 60   Temp 97.8  F (36.6  C) (Tympanic)   SpO2 92%   General - The patient is well nourished and well developed, and appears to have good nutritional status.  Alert and oriented to person and place, answers questions and cooperates with examination appropriately.   Head and Face - Normocephalic and atraumatic, with no gross asymmetry noted.  The facial nerve is intact, with strong symmetric movements.  Voice and Breathing  - The patient was breathing comfortably without the use of accessory muscles. There was no wheezing, stridor, or stertor.  The patients voice was clear and strong, and had appropriate pitch and quality.  Ears - Ears examined with otoscope and under otologic microscopy. The external auditory canals are patent, the tympanic membranes are intact without effusion, retraction or mass.  Bony landmarks are intact.  Eyes - Extraocular movements intact, and the pupils were reactive to light.  Sclera were not icteric or injected, conjunctiva were pink and moist.  Mouth - Examination of the oral cavity showed pink, healthy oral mucosa. No lesions or ulcerations noted.  The tongue was mobile and midline, and the dentition was plate.   Throat - The walls of the oropharynx were smooth, pink, moist, symmetric, and had no lesions or ulcerations.  The tonsillar pillars and soft palate were symmetric.  The uvula was midline on elevation.    Neck - Normal midline excursion of the laryngotracheal complex during swallowing.  Full range of motion on passive movement.  Palpation of the occipital, submental, submandibular, internal jugular chain, and supraclavicular nodes did not demonstrate any abnormal lymph nodes or masses.  Palpation of the thyroid was soft and smooth, with no nodules or goiter appreciated.  The trachea was mobile and midline.  Nose - External contour is symmetric, no gross deflection or scars.  Nasal mucosa is pink and moist with no abnormal mucus.           ASSESSMENT:    ICD-10-CM    1. Sensorineural hearing loss (SNHL) of both ears  H90.3    2. Tinnitus, bilateral  H93.13    3. Hx of sudden hearing loss  Z86.69     REported hearing loss in june, but recovered.        He did have a prior MRI of brain and IAC completed from outside facility in June due to right hearing change.   Will request results and imaging for review.   He does not believe an audiogram was completed at that time.   I am not able to determine if  he had sudden SNHl and recovered or an effusion. However, at this time, he feels his right ear has improved to his baseline.     Hearing protection recommended.   He will consider Webinar regarding Audiology and neurotology options from the [facility] in September. If he is a candidate, he is interested in this option.   Nonetheless, if he declines , he will proceed with hearing aid consult here.    A follow-up audiogram is recommended in 12 months.  This should be sooner if a patient develops vertigo, new or asymmetric hearing loss or unresolved unilateral tinnitus.  If these symptoms were to develop, an MRI would need to be obtained.    The recommendations from the tinnitus brochure were discussed.  The most common reason for tinnitus is damage to the microscopic endings of the hearing nerve in the inner ear.  Injury to the nerve endings brings on hearing loss and often tinnitus.  Advancing age can accompany hearing loss and tinnitus.  If a person is younger, loud noise probably is the leading cause of tinnitus.  Delayed damage to hearing is often seen as well.  Ear protection from noise exposure was reviewed.  Highlights included low salt diet, control of hypertension, avoiding stimulants such as caffeine, tobacco or alcohol and proper sleep, exercise and stress management.      He agrees with this plan.       Brittney Miller PA-C  ENT  [facility] [place]  [phone]      Again, thank you for allowing me to participate in the care of your patient.        Sincerely,        Brittney Miller PA-C

## 2020-08-20 NOTE — PATIENT INSTRUCTIONS
Release of prior audiograms and MRIs.   Ears- Normal ear exam. No fluid present.   Hearing- consider hearing aid consult.     We spent the remainder of today's visit discussing the current leading theory on tinnitus, in that it originates from the central nervous system itself, similar to Phantom Limb Syndrome.  Also discussed were steps that can be taken to mask the noise, such as a low volume de-tuned radio, a fan in the background, and hearing aids.  Correlation with stress, anxiety, depression, and high blood pressure were also discussed.  The patient was also cautioned on the numerous expensive non-pharmaceutical options that are advertised, and have no proven benefit.    The patient will follow up as necessary for worsening symptoms, changes in symptoms, or signs of infection.  I have also recommended yearly audiograms, and consideration of a hearing aid evaluation.    Thank you for allowing Brittney Miller PA-C and our ENT team to participate in your care.  If your medications are too expensive, please give the nurse a call.  We can possibly change this medication.  If you have a scheduling or an appointment question please contact our Health Unit Coordinator at their direct line [phone].   ALL nursing questions or concerns can be directed to your ENT nurse at: [phone] Sarahy

## 2020-08-20 NOTE — NURSING NOTE
"Chief Complaint   Patient presents with     Hearing Problem     Pt has been referred by [facility] for bilateral decreased hearing.       Initial /70   Pulse 60   Temp 97.8  F (36.6  C) (Tympanic)   SpO2 92%  Estimated body mass index is 41.05 kg/m  as calculated from the following:    Height as of 5/14/18: 1.753 m (5' 9\").    Weight as of 5/14/18: 126.1 kg (278 lb).  Medication Reconciliation: complete  Swati Kwan LPN  "

## 2020-08-20 NOTE — PROGRESS NOTES
Audiology Evaluation Completed. Please refer SCANNED AUDIOGRAM and/or TYMPANOGRAM for BACKGROUND, RESULTS, RECOMMENDATIONS.      Serena FERGUSON, [facility]  Audiologist #[id number]